# Patient Record
Sex: FEMALE | Race: WHITE | NOT HISPANIC OR LATINO | Employment: FULL TIME | URBAN - METROPOLITAN AREA
[De-identification: names, ages, dates, MRNs, and addresses within clinical notes are randomized per-mention and may not be internally consistent; named-entity substitution may affect disease eponyms.]

---

## 2017-05-11 ENCOUNTER — HOSPITAL ENCOUNTER (OUTPATIENT)
Dept: FAMILY MEDICINE CLINIC | Facility: CLINIC | Age: 57
Setting detail: SPECIMEN
Discharge: HOME | End: 2017-05-11
Attending: FAMILY MEDICINE | Admitting: FAMILY MEDICINE

## 2017-05-11 LAB
ALBUMIN SERPL-MCNC: 3.8 G/DL (ref 3.5–4.8)
ALP SERPL-CCNC: 79 IU/L (ref 32–91)
ALT SERPL-CCNC: 30 IU/L (ref 14–54)
AST SERPL-CCNC: 23 IU/L (ref 15–41)
BILIRUB DIRECT SERPL-MCNC: 0.1 MG/DL (ref 0.1–0.5)
BILIRUB SERPL-MCNC: 0.7 MG/DL (ref 0.3–1.2)
CHOLEST SERPL-MCNC: 169 MG/DL
CHOLEST/HDLC SERPL: 3.5 {RATIO}
CONV LDL CHOLESTEROL DIRECT: 99 MG/DL (ref 0–100)
CONV TOTAL PROTEIN: 6.9 G/DL (ref 6.1–7.9)
HDLC SERPL-MCNC: 48 MG/DL
LDLC/HDLC SERPL: 2.1 {RATIO}
LIPID INTERPRETATION: ABNORMAL
TRIGL SERPL-MCNC: 124 MG/DL
TSH SERPL-ACNC: 3.06 UIU/ML (ref 0.34–5.6)
VLDLC SERPL CALC-MCNC: 21.5 MG/DL

## 2018-06-08 ENCOUNTER — HOSPITAL ENCOUNTER (OUTPATIENT)
Dept: FAMILY MEDICINE CLINIC | Facility: CLINIC | Age: 58
Setting detail: SPECIMEN
Discharge: HOME | End: 2018-06-08
Attending: FAMILY MEDICINE | Admitting: FAMILY MEDICINE

## 2018-06-08 LAB
ALBUMIN SERPL-MCNC: 4.1 G/DL (ref 3.5–4.8)
ALBUMIN/GLOB SERPL: 1.5 {RATIO} (ref 1–1.7)
ALP SERPL-CCNC: 80 IU/L (ref 32–91)
ALT SERPL-CCNC: 26 IU/L (ref 14–54)
ANION GAP SERPL CALC-SCNC: 12.5 MMOL/L (ref 10–20)
AST SERPL-CCNC: 25 IU/L (ref 15–41)
BILIRUB SERPL-MCNC: 1.1 MG/DL (ref 0.3–1.2)
BUN SERPL-MCNC: 11 MG/DL (ref 8–20)
BUN/CREAT SERPL: 15.7 (ref 5.4–26.2)
CALCIUM SERPL-MCNC: 9.4 MG/DL (ref 8.9–10.3)
CHLORIDE SERPL-SCNC: 97 MMOL/L (ref 101–111)
CHOLEST SERPL-MCNC: 229 MG/DL
CHOLEST/HDLC SERPL: 3.6 {RATIO}
CONV CO2: 27 MMOL/L (ref 22–32)
CONV LDL CHOLESTEROL DIRECT: 155 MG/DL (ref 0–100)
CONV TOTAL PROTEIN: 6.9 G/DL (ref 6.1–7.9)
CREAT UR-MCNC: 0.7 MG/DL (ref 0.4–1)
GLOBULIN UR ELPH-MCNC: 2.8 G/DL (ref 2.5–3.8)
GLUCOSE SERPL-MCNC: 91 MG/DL (ref 65–99)
HDLC SERPL-MCNC: 63 MG/DL
LDLC/HDLC SERPL: 2.5 {RATIO}
LIPID INTERPRETATION: ABNORMAL
POTASSIUM SERPL-SCNC: 3.5 MMOL/L (ref 3.6–5.1)
SODIUM SERPL-SCNC: 133 MMOL/L (ref 136–144)
TRIGL SERPL-MCNC: 109 MG/DL
VLDLC SERPL CALC-MCNC: 10.9 MG/DL

## 2018-11-15 ENCOUNTER — HOSPITAL ENCOUNTER (OUTPATIENT)
Dept: FAMILY MEDICINE CLINIC | Facility: CLINIC | Age: 58
Setting detail: SPECIMEN
Discharge: HOME | End: 2018-11-15
Attending: FAMILY MEDICINE | Admitting: FAMILY MEDICINE

## 2018-11-15 LAB
ANION GAP SERPL CALC-SCNC: 13.2 MMOL/L (ref 10–20)
BUN SERPL-MCNC: 14 MG/DL (ref 8–20)
BUN/CREAT SERPL: 20 (ref 5.4–26.2)
CALCIUM SERPL-MCNC: 9 MG/DL (ref 8.9–10.3)
CHLORIDE SERPL-SCNC: 106 MMOL/L (ref 101–111)
CONV CO2: 25 MMOL/L (ref 22–32)
CREAT UR-MCNC: 0.7 MG/DL (ref 0.4–1)
GLUCOSE SERPL-MCNC: 98 MG/DL (ref 65–99)
POTASSIUM SERPL-SCNC: 4.2 MMOL/L (ref 3.6–5.1)
SODIUM SERPL-SCNC: 140 MMOL/L (ref 136–144)

## 2019-02-27 ENCOUNTER — HOSPITAL ENCOUNTER (OUTPATIENT)
Dept: FAMILY MEDICINE CLINIC | Facility: CLINIC | Age: 59
Setting detail: SPECIMEN
Discharge: HOME | End: 2019-02-27
Attending: FAMILY MEDICINE | Admitting: FAMILY MEDICINE

## 2019-05-16 ENCOUNTER — HOSPITAL ENCOUNTER (OUTPATIENT)
Dept: FAMILY MEDICINE CLINIC | Facility: CLINIC | Age: 59
Setting detail: SPECIMEN
Discharge: HOME | End: 2019-05-16
Attending: FAMILY MEDICINE | Admitting: FAMILY MEDICINE

## 2019-05-16 LAB
T4 FREE SERPL-MCNC: 1.68 NG/DL (ref 0.58–1.64)
TSH SERPL-ACNC: 0.02 UIU/ML (ref 0.34–5.6)

## 2019-07-02 RX ORDER — MONTELUKAST SODIUM 10 MG/1
1 TABLET ORAL DAILY
COMMUNITY
Start: 2019-02-27

## 2019-07-02 RX ORDER — MONTELUKAST SODIUM 10 MG/1
TABLET ORAL
Qty: 90 TABLET | Refills: 0 | OUTPATIENT
Start: 2019-07-02

## 2019-07-02 NOTE — TELEPHONE ENCOUNTER
Last visit:  5/16/19  Next visit: none  Last labs: 5/16/19    Rx requested: Singulair 10mg  Pharmacy: [facility] in [place]

## 2019-11-01 ENCOUNTER — TELEPHONE (OUTPATIENT)
Dept: FAMILY MEDICINE CLINIC | Facility: CLINIC | Age: 59
End: 2019-11-01

## 2019-11-01 RX ORDER — TRAZODONE HYDROCHLORIDE 50 MG/1
TABLET ORAL EVERY 24 HOURS
COMMUNITY
Start: 2017-07-27 | End: 2019-12-05 | Stop reason: SDUPTHER

## 2019-11-01 RX ORDER — LEVOTHYROXINE SODIUM 0.07 MG/1
TABLET ORAL EVERY 24 HOURS
COMMUNITY
Start: 2017-07-27 | End: 2019-11-14 | Stop reason: SDUPTHER

## 2019-11-01 RX ORDER — LOSARTAN POTASSIUM 50 MG/1
TABLET ORAL EVERY 24 HOURS
COMMUNITY
Start: 2016-03-21 | End: 2019-12-05

## 2019-11-01 RX ORDER — ATORVASTATIN CALCIUM 40 MG/1
TABLET, FILM COATED ORAL EVERY 24 HOURS
COMMUNITY
Start: 2017-07-27 | End: 2020-06-15 | Stop reason: SDUPTHER

## 2019-11-01 RX ORDER — VITAMIN B COMPLEX
1 TABLET ORAL DAILY
COMMUNITY
Start: 2018-06-08

## 2019-11-01 RX ORDER — AZELASTINE HCL 205.5 UG/1
SPRAY NASAL
COMMUNITY
Start: 2019-02-27 | End: 2019-12-05

## 2019-11-01 RX ORDER — CHOLECALCIFEROL (VITAMIN D3) 50 MCG
TABLET ORAL
COMMUNITY
Start: 2016-05-26

## 2019-11-13 DIAGNOSIS — E78.2 MIXED HYPERLIPIDEMIA: Primary | ICD-10-CM

## 2019-11-13 DIAGNOSIS — E03.9 ACQUIRED HYPOTHYROIDISM: ICD-10-CM

## 2019-11-13 PROBLEM — M51.36 DEGENERATION OF INTERVERTEBRAL DISC OF LUMBAR REGION: Status: ACTIVE | Noted: 2017-08-01

## 2019-11-13 RX ORDER — TRAZODONE HYDROCHLORIDE 50 MG/1
TABLET ORAL
Qty: 90 TABLET | Refills: 4 | OUTPATIENT
Start: 2019-11-13

## 2019-11-13 RX ORDER — LEVOTHYROXINE SODIUM 88 UG/1
TABLET ORAL
Qty: 90 TABLET | Refills: 4 | OUTPATIENT
Start: 2019-11-13

## 2019-11-14 RX ORDER — LEVOTHYROXINE SODIUM 0.07 MG/1
75 TABLET ORAL EVERY 24 HOURS
Qty: 90 TABLET | Refills: 0 | Status: SHIPPED | OUTPATIENT
Start: 2019-11-14 | End: 2020-01-02 | Stop reason: SDUPTHER

## 2019-11-14 NOTE — TELEPHONE ENCOUNTER
Pt scheduled both labs and f/u and wants to know if you can [facility]

## 2019-11-26 ENCOUNTER — CLINICAL SUPPORT (OUTPATIENT)
Dept: FAMILY MEDICINE CLINIC | Facility: CLINIC | Age: 59
End: 2019-11-26

## 2019-11-26 DIAGNOSIS — F32.A DEPRESSIVE DISORDER: ICD-10-CM

## 2019-11-26 DIAGNOSIS — E78.2 MIXED HYPERLIPIDEMIA: ICD-10-CM

## 2019-11-26 DIAGNOSIS — E03.9 ACQUIRED HYPOTHYROIDISM: ICD-10-CM

## 2019-11-26 PROCEDURE — 80061 LIPID PANEL: CPT | Performed by: FAMILY MEDICINE

## 2019-11-26 PROCEDURE — 36415 COLL VENOUS BLD VENIPUNCTURE: CPT | Performed by: FAMILY MEDICINE

## 2019-11-26 PROCEDURE — 80053 COMPREHEN METABOLIC PANEL: CPT | Performed by: FAMILY MEDICINE

## 2019-11-27 LAB
ALBUMIN SERPL-MCNC: 4.3 G/DL (ref 3.5–5.2)
ALBUMIN/GLOB SERPL: 1.5 G/DL
ALP SERPL-CCNC: 79 U/L (ref 39–117)
ALT SERPL W P-5'-P-CCNC: 30 U/L (ref 1–33)
ANION GAP SERPL CALCULATED.3IONS-SCNC: 14.3 MMOL/L (ref 5–15)
AST SERPL-CCNC: 25 U/L (ref 1–32)
BILIRUB SERPL-MCNC: 0.3 MG/DL (ref 0.2–1.2)
BUN BLD-MCNC: 15 MG/DL (ref 8–23)
BUN/CREAT SERPL: 24.6 (ref 7–25)
CALCIUM SPEC-SCNC: 9.5 MG/DL (ref 8.6–10.5)
CHLORIDE SERPL-SCNC: 120 MMOL/L (ref 98–107)
CHOLEST SERPL-MCNC: 142 MG/DL (ref 0–200)
CO2 SERPL-SCNC: 25.7 MMOL/L (ref 22–29)
CREAT BLD-MCNC: 0.61 MG/DL (ref 0.57–1)
GFR SERPL CREATININE-BSD FRML MDRD: 100 ML/MIN/1.73
GLOBULIN UR ELPH-MCNC: 2.9 GM/DL
GLUCOSE BLD-MCNC: 91 MG/DL (ref 65–99)
HDLC SERPL-MCNC: 65 MG/DL (ref 40–60)
LDLC SERPL CALC-MCNC: 67 MG/DL (ref 0–100)
LDLC/HDLC SERPL: 1.02 {RATIO}
POTASSIUM BLD-SCNC: 4.7 MMOL/L (ref 3.5–5.2)
PROT SERPL-MCNC: 7.2 G/DL (ref 6–8.5)
SODIUM BLD-SCNC: 160 MMOL/L (ref 136–145)
TRIGL SERPL-MCNC: 52 MG/DL (ref 0–150)
VLDLC SERPL-MCNC: 10.4 MG/DL (ref 5–40)

## 2019-12-03 DIAGNOSIS — E87.0 HYPERNATREMIA: Primary | ICD-10-CM

## 2019-12-05 ENCOUNTER — OFFICE VISIT (OUTPATIENT)
Dept: FAMILY MEDICINE CLINIC | Facility: CLINIC | Age: 59
End: 2019-12-05

## 2019-12-05 VITALS
DIASTOLIC BLOOD PRESSURE: 84 MMHG | OXYGEN SATURATION: 100 % | RESPIRATION RATE: 14 BRPM | WEIGHT: 198 LBS | SYSTOLIC BLOOD PRESSURE: 130 MMHG | HEART RATE: 63 BPM | BODY MASS INDEX: 30.01 KG/M2 | TEMPERATURE: 97.8 F | HEIGHT: 68 IN

## 2019-12-05 DIAGNOSIS — F51.01 PRIMARY INSOMNIA: ICD-10-CM

## 2019-12-05 DIAGNOSIS — J06.9 ACUTE URI: ICD-10-CM

## 2019-12-05 DIAGNOSIS — F33.1 MODERATE EPISODE OF RECURRENT MAJOR DEPRESSIVE DISORDER (HCC): Primary | ICD-10-CM

## 2019-12-05 DIAGNOSIS — E55.9 VITAMIN D DEFICIENCY: ICD-10-CM

## 2019-12-05 DIAGNOSIS — F41.9 ANXIETY: ICD-10-CM

## 2019-12-05 PROBLEM — E03.9 HYPOTHYROIDISM: Status: ACTIVE | Noted: 2019-12-05

## 2019-12-05 PROCEDURE — 82306 VITAMIN D 25 HYDROXY: CPT | Performed by: FAMILY MEDICINE

## 2019-12-05 PROCEDURE — 80053 COMPREHEN METABOLIC PANEL: CPT | Performed by: FAMILY MEDICINE

## 2019-12-05 PROCEDURE — 99214 OFFICE O/P EST MOD 30 MIN: CPT | Performed by: FAMILY MEDICINE

## 2019-12-05 RX ORDER — TRIAMCINOLONE ACETONIDE 55 UG/1
2 SPRAY, METERED NASAL DAILY
COMMUNITY

## 2019-12-05 RX ORDER — TRAZODONE HYDROCHLORIDE 50 MG/1
TABLET ORAL
Qty: 60 TABLET | Refills: 3 | Status: SHIPPED | OUTPATIENT
Start: 2019-12-05 | End: 2020-01-02 | Stop reason: SDUPTHER

## 2019-12-05 RX ORDER — ALPRAZOLAM 0.25 MG/1
0.25 TABLET ORAL 2 TIMES DAILY PRN
Qty: 40 TABLET | Refills: 0 | Status: SHIPPED | OUTPATIENT
Start: 2019-12-05

## 2019-12-05 RX ORDER — DOXYCYCLINE 100 MG/1
200 TABLET ORAL DAILY
Qty: 20 TABLET | Refills: 0 | Status: SHIPPED | OUTPATIENT
Start: 2019-12-05 | End: 2020-01-02

## 2019-12-05 RX ORDER — BUPROPION HYDROCHLORIDE 150 MG/1
150 TABLET ORAL DAILY
Qty: 30 TABLET | Refills: 1 | Status: SHIPPED | OUTPATIENT
Start: 2019-12-05 | End: 2020-01-02

## 2019-12-05 NOTE — PROGRESS NOTES
Subjective   Shira Dumont is a 59 y.o. female.     Chief Complaint   Patient presents with   • Sinus Problem     sinus headache,nasal congestion x 1 week. pt does allergy injections.   • Anxiety         Current Outpatient Medications:   •  Apple Cider Vinegar 300 MG tablet, Take 2 tablets by mouth Daily., Disp: , Rfl:   •  atorvastatin (LIPITOR) 40 MG tablet, Daily., Disp: , Rfl:   •  B Complex Vitamins (B COMPLEX 50) tablet, B COMPLEX 50 ORAL TABLET, Disp: , Rfl:   •  Cholecalciferol (VITAMIN D) 50 MCG (2000 UT) tablet, VITAMIN D 2000 UNIT TABS, Disp: , Rfl:   •  levothyroxine (LEVOXYL) 75 MCG tablet, Take 1 tablet by mouth Daily., Disp: 90 tablet, Rfl: 0  •  montelukast (SINGULAIR) 10 MG tablet, Take 1 tablet by mouth Daily., Disp: , Rfl:   •  traZODone (DESYREL) 50 MG tablet, 1 - 2 po qhs prn insomnia, Disp: 60 tablet, Rfl: 3  •  Triamcinolone Acetonide (NASACORT) 55 MCG/ACT nasal inhaler, 2 sprays into the nostril(s) as directed by provider Daily., Disp: , Rfl:   •  ALPRAZolam (XANAX) 0.25 MG tablet, Take 1 tablet by mouth 2 (Two) Times a Day As Needed for Anxiety., Disp: 40 tablet, Rfl: 0  •  buPROPion XL (WELLBUTRIN XL) 150 MG 24 hr tablet, Take 1 tablet by mouth Daily., Disp: 30 tablet, Rfl: 1  •  doxycycline (ADOXA) 100 MG tablet, Take 2 tablets by mouth Daily., Disp: 20 tablet, Rfl: 0    Past Medical History:   Diagnosis Date   • Anxiety    • Degeneration of intervertebral disc of lumbar region 2017   • Depressive disorder 3/21/2016   • Hypothyroidism        Past Surgical History:   Procedure Laterality Date   • ACHILLES TENDON SURGERY Right    • BREAST BIOPSY      Left Bx = NEG   •  SECTION     • CYST REMOVAL Left     Left Wrist = Benign Cyst   • ESSURE TUBAL LIGATION     • TONSILLECTOMY         Family History   Problem Relation Age of Onset   • Cancer Mother         Esophageal Cancer   • Heart disease Father    • Hypertension Father    • Cancer Father         Breast Cancer       Social  History     Socioeconomic History   • Marital status:      Spouse name: Not on file   • Number of children: Not on file   • Years of education: Not on file   • Highest education level: Not on file   Tobacco Use   • Smoking status: Never Smoker   • Smokeless tobacco: Never Used   Substance and Sexual Activity   • Alcohol use: Yes     Comment: 1-2   • Drug use: No   • Sexual activity: Defer       58 y/o C female here for c/o's worsening Anx/ depression/ insomnia and sinus c/o's x 1 wk      Pt has tried Cymbalta/ Prozac/ Wellbutrin in past for Depression but no meds for anxiety tried in the past    Pt has had a lot of stress over the recent years w/ a teenage son w/ Autism/  a bad divorce/ her dad  and her best friend committed suicide-----Pt feels like she is all alone w/o much support but admits she does have a grown daughter     Pt not sleeping well either and states the first time she took Trazodone, she was so tired in am, she called sick into work; she tends to wake up throughout the nite off/on    Pt was frustrated about not being able to lose wt and saw wilder who confirmed the decrease in thyroid med was adequate..... He encouraged better eating habits and she gave it a month and gained 15#!!! Now she has 30# to lose ....the patient admits she really doesn't have time to exercise due to working and having to stick to a rigid schdule for her son......then she is so tired by the time he goes to bed at 8pm, she does too         The following portions of the patient's history were reviewed and updated as appropriate: allergies, current medications, past family history, past medical history, past social history, past surgical history and problem list.    Review of Systems   Constitutional: Positive for fatigue. Negative for chills and fever.   HENT: Positive for congestion and sinus pressure. Negative for ear discharge, ear pain, postnasal drip, rhinorrhea, sneezing, sore throat and swollen glands.     Eyes: Negative for pain, discharge, redness, itching and visual disturbance.   Respiratory: Negative for cough, shortness of breath, wheezing and stridor.    Skin: Negative for rash.   Allergic/Immunologic: Negative for environmental allergies.   Psychiatric/Behavioral: Positive for decreased concentration, sleep disturbance, depressed mood and stress. Negative for self-injury and suicidal ideas. The patient is nervous/anxious.        Vitals:    12/05/19 0834   BP: 130/84   Pulse: 63   Resp: 14   Temp: 97.8 °F (36.6 °C)   SpO2: 100%       Objective   Physical Exam   Constitutional: She is oriented to person, place, and time. She appears well-developed and well-nourished. No distress.   HENT:   Head: Normocephalic and atraumatic.   Right Ear: External ear normal.   Left Ear: External ear normal.   Nose: Nasal deformity (Rt nasal polyp) present.   Mouth/Throat: Oropharynx is clear and moist. No oropharyngeal exudate.   Eyes: Conjunctivae and EOM are normal. Pupils are equal, round, and reactive to light. Right eye exhibits no discharge. Left eye exhibits no discharge. No scleral icterus.   Neck: Normal range of motion. Neck supple. No thyromegaly present.   Cardiovascular: Normal rate, regular rhythm, normal heart sounds and intact distal pulses.   No murmur heard.  Pulmonary/Chest: Effort normal and breath sounds normal. No respiratory distress. She has no wheezes. She has no rales.   Lymphadenopathy:     She has no cervical adenopathy.   Neurological: She is alert and oriented to person, place, and time. No cranial nerve deficit.   Skin: Skin is warm and dry. No rash noted. She is not diaphoretic. No erythema. No pallor.   Psychiatric: Her speech is normal and behavior is normal. Judgment and thought content normal. She is not actively hallucinating. Cognition and memory are normal. She exhibits a depressed mood. She is attentive.   Nursing note and vitals reviewed.        Assessment/Plan   Shira was seen today  for sinus problem and anxiety.    Diagnoses and all orders for this visit:    Moderate episode of recurrent major depressive disorder (CMS/HCC)    Anxiety    Acute URI    Primary insomnia    Vitamin D deficiency  -     Vitamin D 25 Hydroxy    Other orders  -     traZODone (DESYREL) 50 MG tablet; 1 - 2 po qhs prn insomnia  -     buPROPion XL (WELLBUTRIN XL) 150 MG 24 hr tablet; Take 1 tablet by mouth Daily.  -     ALPRAZolam (XANAX) 0.25 MG tablet; Take 1 tablet by mouth 2 (Two) Times a Day As Needed for Anxiety.  -     doxycycline (ADOXA) 100 MG tablet; Take 2 tablets by mouth Daily.    RTC 1mo

## 2019-12-06 ENCOUNTER — TELEPHONE (OUTPATIENT)
Dept: FAMILY MEDICINE CLINIC | Facility: CLINIC | Age: 59
End: 2019-12-06

## 2019-12-06 LAB
25(OH)D3 SERPL-MCNC: 42 NG/ML (ref 30–100)
ALBUMIN SERPL-MCNC: 4.6 G/DL (ref 3.5–5.2)
ALBUMIN/GLOB SERPL: 1.6 G/DL
ALP SERPL-CCNC: 84 U/L (ref 39–117)
ALT SERPL W P-5'-P-CCNC: 29 U/L (ref 1–33)
ANION GAP SERPL CALCULATED.3IONS-SCNC: 13.9 MMOL/L (ref 5–15)
AST SERPL-CCNC: 27 U/L (ref 1–32)
BILIRUB SERPL-MCNC: 0.3 MG/DL (ref 0.2–1.2)
BUN BLD-MCNC: 19 MG/DL (ref 8–23)
BUN/CREAT SERPL: 30.2 (ref 7–25)
CALCIUM SPEC-SCNC: 9.5 MG/DL (ref 8.6–10.5)
CHLORIDE SERPL-SCNC: 107 MMOL/L (ref 98–107)
CO2 SERPL-SCNC: 25.1 MMOL/L (ref 22–29)
CREAT BLD-MCNC: 0.63 MG/DL (ref 0.57–1)
GFR SERPL CREATININE-BSD FRML MDRD: 96 ML/MIN/1.73
GLOBULIN UR ELPH-MCNC: 2.9 GM/DL
GLUCOSE BLD-MCNC: 92 MG/DL (ref 65–99)
POTASSIUM BLD-SCNC: 4.6 MMOL/L (ref 3.5–5.2)
PROT SERPL-MCNC: 7.5 G/DL (ref 6–8.5)
SODIUM BLD-SCNC: 146 MMOL/L (ref 136–145)

## 2019-12-06 NOTE — TELEPHONE ENCOUNTER
[facility]ed [facility] to request Mammo results from 2019.  They do not have any record of her having her mammo done there.  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  Patient is going to try to get mammo results.

## 2020-01-02 ENCOUNTER — RESULTS ENCOUNTER (OUTPATIENT)
Dept: FAMILY MEDICINE CLINIC | Facility: CLINIC | Age: 60
End: 2020-01-02

## 2020-01-02 ENCOUNTER — OFFICE VISIT (OUTPATIENT)
Dept: FAMILY MEDICINE CLINIC | Facility: CLINIC | Age: 60
End: 2020-01-02

## 2020-01-02 VITALS
TEMPERATURE: 97.5 F | WEIGHT: 196 LBS | HEART RATE: 69 BPM | RESPIRATION RATE: 14 BRPM | DIASTOLIC BLOOD PRESSURE: 87 MMHG | HEIGHT: 68 IN | SYSTOLIC BLOOD PRESSURE: 126 MMHG | OXYGEN SATURATION: 97 % | BODY MASS INDEX: 29.7 KG/M2

## 2020-01-02 DIAGNOSIS — E87.0 HYPERNATREMIA: ICD-10-CM

## 2020-01-02 DIAGNOSIS — F33.41 RECURRENT MAJOR DEPRESSIVE DISORDER, IN PARTIAL REMISSION (HCC): Primary | ICD-10-CM

## 2020-01-02 DIAGNOSIS — F51.01 PRIMARY INSOMNIA: ICD-10-CM

## 2020-01-02 DIAGNOSIS — Z12.11 COLON CANCER SCREENING: ICD-10-CM

## 2020-01-02 DIAGNOSIS — S39.012A ACUTE MYOFASCIAL STRAIN OF LUMBAR REGION, INITIAL ENCOUNTER: ICD-10-CM

## 2020-01-02 DIAGNOSIS — R23.8 SCALP IRRITATION: ICD-10-CM

## 2020-01-02 DIAGNOSIS — L65.9 PATCHY LOSS OF HAIR: ICD-10-CM

## 2020-01-02 PROCEDURE — 99214 OFFICE O/P EST MOD 30 MIN: CPT | Performed by: FAMILY MEDICINE

## 2020-01-02 RX ORDER — CYCLOBENZAPRINE HCL 10 MG
10 TABLET ORAL NIGHTLY PRN
Qty: 10 TABLET | Refills: 0 | Status: SHIPPED | OUTPATIENT
Start: 2020-01-02 | End: 2020-01-03

## 2020-01-02 RX ORDER — BUPROPION HYDROCHLORIDE 300 MG/1
300 TABLET ORAL EVERY MORNING
Qty: 30 TABLET | Refills: 1 | Status: SHIPPED | OUTPATIENT
Start: 2020-01-02 | End: 2020-03-09

## 2020-01-02 RX ORDER — LEVOTHYROXINE SODIUM 0.07 MG/1
75 TABLET ORAL EVERY 24 HOURS
Qty: 90 TABLET | Refills: 2 | Status: SHIPPED | OUTPATIENT
Start: 2020-01-02 | End: 2020-12-03 | Stop reason: SDUPTHER

## 2020-01-02 RX ORDER — TRAZODONE HYDROCHLORIDE 50 MG/1
TABLET ORAL
Qty: 60 TABLET | Refills: 3
Start: 2020-01-02 | End: 2020-03-30 | Stop reason: SDUPTHER

## 2020-01-02 NOTE — PROGRESS NOTES
Subjective   Shira Dumont is a 59 y.o. female.     Chief Complaint   Patient presents with   • Depression     1 month f/u on buproprion. Also her sodium level was too high.           Current Outpatient Medications:   •  ALPRAZolam (XANAX) 0.25 MG tablet, Take 1 tablet by mouth 2 (Two) Times a Day As Needed for Anxiety., Disp: 40 tablet, Rfl: 0  •  Apple Cider Vinegar 300 MG tablet, Take 2 tablets by mouth Daily., Disp: , Rfl:   •  atorvastatin (LIPITOR) 40 MG tablet, Daily., Disp: , Rfl:   •  B Complex Vitamins (B COMPLEX 50) tablet, Take 1 tablet by mouth Daily., Disp: , Rfl:   •  buPROPion XL (WELLBUTRIN XL) 300 MG 24 hr tablet, Take 1 tablet by mouth Every Morning., Disp: 30 tablet, Rfl: 1  •  Cholecalciferol (VITAMIN D) 50 MCG (2000 UT) tablet, VITAMIN D 2000 UNIT TABS, Disp: , Rfl:   •  levothyroxine (LEVOXYL) 75 MCG tablet, Take 1 tablet by mouth Daily., Disp: 90 tablet, Rfl: 2  •  montelukast (SINGULAIR) 10 MG tablet, Take 1 tablet by mouth Daily., Disp: , Rfl:   •  Multiple Vitamins-Minerals (HAIR SKIN AND NAILS FORMULA PO), Take 2 tablets by mouth Daily., Disp: , Rfl:   •  traZODone (DESYREL) 50 MG tablet, 3 po qhs prn insomnia, Disp: 60 tablet, Rfl: 3  •  Triamcinolone Acetonide (NASACORT) 55 MCG/ACT nasal inhaler, 2 sprays into the nostril(s) as directed by provider Daily., Disp: , Rfl:   •  cyclobenzaprine (FLEXERIL) 10 MG tablet, Take 1 tablet by mouth At Night As Needed for Muscle Spasms., Disp: 10 tablet, Rfl: 0    Past Medical History:   Diagnosis Date   • Anxiety    • Degeneration of intervertebral disc of lumbar region 2017   • Depressive disorder 3/21/2016   • Hypothyroidism        Past Surgical History:   Procedure Laterality Date   • ACHILLES TENDON SURGERY Right    • BREAST BIOPSY      Left Bx = NEG   •  SECTION     • CYST REMOVAL Left     Left Wrist = Benign Cyst   • ESSURE TUBAL LIGATION     • TONSILLECTOMY         Family History   Problem Relation Age of Onset   • Cancer Mother          Esophageal Cancer   • Heart disease Father    • Hypertension Father    • Cancer Father         Breast Cancer       Social History     Socioeconomic History   • Marital status:      Spouse name: Not on file   • Number of children: Not on file   • Years of education: Not on file   • Highest education level: Not on file   Tobacco Use   • Smoking status: Never Smoker   • Smokeless tobacco: Never Used   Substance and Sexual Activity   • Alcohol use: Yes     Comment: 1-2   • Drug use: No   • Sexual activity: Defer       58 y/o C female here for f/u on anti-depressant med/ insomnia    Pt feeling some better mentally but not feeling completely there yet and still not sleeping well on the 100mg Trazodone QHS    Pt also states she has been having hair breakage/ loss in the area above her Left ear only off/on x 6mos-------the same area will have hair breakage at the base and then eventually grow back    Pt also states she hurt her back again and wanting something for ms spasm-----Pt used to ride horses in [place] and did gymnastics so this is not new for her       The following portions of the patient's history were reviewed and updated as appropriate: allergies, current medications, past family history, past medical history, past social history, past surgical history and problem list.    Review of Systems   Constitutional: Negative for appetite change, unexpected weight gain and unexpected weight loss.   Endocrine:        Patchy hair loss @ Left supra- auricular area    Musculoskeletal: Positive for arthralgias, back pain and myalgias.   Skin: Negative for rash.   Psychiatric/Behavioral: Positive for dysphoric mood, sleep disturbance and depressed mood. The patient is not nervous/anxious.        Vitals:    01/02/20 0812   BP: 126/87   Pulse: 69   Resp: 14   Temp: 97.5 °F (36.4 °C)   SpO2: 97%       Objective   Physical Exam   Constitutional: She is oriented to person, place, and time. She appears well-developed  and well-nourished. No distress.   HENT:   Head:       Neurological: She is alert and oriented to person, place, and time. No cranial nerve deficit.   Skin: Skin is warm and dry. No rash noted. No erythema.   Psychiatric: She has a normal mood and affect. Her behavior is normal. Judgment and thought content normal.   Nursing note and vitals reviewed.        Assessment/Plan   Shira was seen today for depression.    Diagnoses and all orders for this visit:    Recurrent major depressive disorder, in partial remission (CMS/HCC)    Primary insomnia    Patchy loss of hair  -     Ambulatory Referral to Dermatology    Scalp irritation  -     Ambulatory Referral to Dermatology    Acute myofascial strain of lumbar region, initial encounter    Colon cancer screening  -     Cologuard - Stool, Per Rectum; Future    Hypernatremia    Other orders  -     buPROPion XL (WELLBUTRIN XL) 300 MG 24 hr tablet; Take 1 tablet by mouth Every Morning.  -     traZODone (DESYREL) 50 MG tablet; 3 po qhs prn insomnia  -     levothyroxine (LEVOXYL) 75 MCG tablet; Take 1 tablet by mouth Daily.  -     cyclobenzaprine (FLEXERIL) 10 MG tablet; Take 1 tablet by mouth At Night As Needed for Muscle Spasms.    Increase Wellbutrin/ Trazodone dose   Flexeril QHS prn   reviewed recent CMP w/ resolved Hypernatremia  Referral to Derm for recurrent hair loss

## 2020-01-03 ENCOUNTER — TELEPHONE (OUTPATIENT)
Dept: FAMILY MEDICINE CLINIC | Facility: CLINIC | Age: 60
End: 2020-01-03

## 2020-01-03 RX ORDER — METHOCARBAMOL 500 MG/1
TABLET, FILM COATED ORAL
Qty: 20 TABLET | Refills: 0 | Status: SHIPPED | OUTPATIENT
Start: 2020-01-03

## 2020-01-03 NOTE — TELEPHONE ENCOUNTER
Patient called stating that she was in to see  recently and was given Flexeril 10mg and it doesn't seem to be helping and she wants to know if she can take 2 of those at night.     (Also when we call her back give her the number to  as we sent the referral but they have not called [phone])

## 2020-01-03 NOTE — TELEPHONE ENCOUNTER
Pt ok to change ms relaxer. States she did not get any sleep last night. Please send new rx to [facility].    Already has appt sched in Feb w [facility].

## 2020-03-09 RX ORDER — BUPROPION HYDROCHLORIDE 300 MG/1
TABLET ORAL
Qty: 30 TABLET | Refills: 3 | Status: SHIPPED | OUTPATIENT
Start: 2020-03-09 | End: 2020-03-30 | Stop reason: SDUPTHER

## 2020-03-30 ENCOUNTER — TELEPHONE (OUTPATIENT)
Dept: FAMILY MEDICINE CLINIC | Facility: CLINIC | Age: 60
End: 2020-03-30

## 2020-03-30 RX ORDER — BUPROPION HYDROCHLORIDE 300 MG/1
300 TABLET ORAL EVERY MORNING
Qty: 90 TABLET | Refills: 0 | Status: SHIPPED | OUTPATIENT
Start: 2020-03-30 | End: 2020-07-13

## 2020-03-30 RX ORDER — TRAZODONE HYDROCHLORIDE 150 MG/1
150 TABLET ORAL NIGHTLY PRN
Qty: 90 TABLET | Refills: 0 | Status: SHIPPED | OUTPATIENT
Start: 2020-03-30 | End: 2020-09-03

## 2020-03-30 NOTE — TELEPHONE ENCOUNTER
Last visit: 1/2/20  Next visit: none  Last labs: 12/5/19    Rx requested: Trazodone, Wellbutrin   [facility]    Can no longer get these local per her insurance.

## 2020-06-15 ENCOUNTER — OFFICE VISIT (OUTPATIENT)
Dept: FAMILY MEDICINE CLINIC | Facility: CLINIC | Age: 60
End: 2020-06-15

## 2020-06-15 VITALS
OXYGEN SATURATION: 100 % | DIASTOLIC BLOOD PRESSURE: 86 MMHG | SYSTOLIC BLOOD PRESSURE: 137 MMHG | TEMPERATURE: 97.5 F | WEIGHT: 197 LBS | BODY MASS INDEX: 29.86 KG/M2 | HEIGHT: 68 IN | HEART RATE: 61 BPM | RESPIRATION RATE: 16 BRPM

## 2020-06-15 DIAGNOSIS — E03.9 ACQUIRED HYPOTHYROIDISM: ICD-10-CM

## 2020-06-15 DIAGNOSIS — T63.461A WASP STING, ACCIDENTAL OR UNINTENTIONAL, INITIAL ENCOUNTER: Primary | ICD-10-CM

## 2020-06-15 PROCEDURE — 84443 ASSAY THYROID STIM HORMONE: CPT | Performed by: FAMILY MEDICINE

## 2020-06-15 PROCEDURE — 99213 OFFICE O/P EST LOW 20 MIN: CPT | Performed by: FAMILY MEDICINE

## 2020-06-15 PROCEDURE — 84481 FREE ASSAY (FT-3): CPT | Performed by: FAMILY MEDICINE

## 2020-06-15 PROCEDURE — 36415 COLL VENOUS BLD VENIPUNCTURE: CPT | Performed by: FAMILY MEDICINE

## 2020-06-15 PROCEDURE — 84439 ASSAY OF FREE THYROXINE: CPT | Performed by: FAMILY MEDICINE

## 2020-06-15 RX ORDER — PROMETHAZINE HYDROCHLORIDE 12.5 MG/1
TABLET ORAL
Qty: 30 TABLET | Refills: 0 | Status: SHIPPED | OUTPATIENT
Start: 2020-06-15

## 2020-06-15 RX ORDER — PREDNISONE 20 MG/1
60 TABLET ORAL DAILY
Qty: 15 TABLET | Refills: 0 | Status: SHIPPED | OUTPATIENT
Start: 2020-06-15

## 2020-06-15 RX ORDER — ATORVASTATIN CALCIUM 40 MG/1
40 TABLET, FILM COATED ORAL EVERY 24 HOURS
Qty: 90 TABLET | Refills: 1 | Status: SHIPPED | OUTPATIENT
Start: 2020-06-15 | End: 2021-04-02 | Stop reason: SDUPTHER

## 2020-06-15 RX ORDER — FAMOTIDINE 20 MG/1
20 TABLET, FILM COATED ORAL 2 TIMES DAILY
Qty: 30 TABLET | Refills: 0 | Status: SHIPPED | OUTPATIENT
Start: 2020-06-15

## 2020-06-15 NOTE — PROGRESS NOTES
Subjective   Shira Dumont is a 59 y.o. female.     Chief Complaint   Patient presents with   • Insect Bite         Current Outpatient Medications:   •  ALPRAZolam (XANAX) 0.25 MG tablet, Take 1 tablet by mouth 2 (Two) Times a Day As Needed for Anxiety., Disp: 40 tablet, Rfl: 0  •  Apple Cider Vinegar 300 MG tablet, Take 2 tablets by mouth Daily., Disp: , Rfl:   •  atorvastatin (LIPITOR) 40 MG tablet, Take 1 tablet by mouth Daily., Disp: 90 tablet, Rfl: 1  •  B Complex Vitamins (B COMPLEX 50) tablet, Take 1 tablet by mouth Daily., Disp: , Rfl:   •  buPROPion XL (WELLBUTRIN XL) 300 MG 24 hr tablet, Take 1 tablet by mouth Every Morning., Disp: 90 tablet, Rfl: 0  •  Cholecalciferol (VITAMIN D) 50 MCG (2000 UT) tablet, VITAMIN D 2000 UNIT TABS, Disp: , Rfl:   •  levothyroxine (LEVOXYL) 75 MCG tablet, Take 1 tablet by mouth Daily., Disp: 90 tablet, Rfl: 2  •  methocarbamol (ROBAXIN) 500 MG tablet, 1-2 po qhs prn ms spasm, Disp: 20 tablet, Rfl: 0  •  montelukast (SINGULAIR) 10 MG tablet, Take 1 tablet by mouth Daily., Disp: , Rfl:   •  Multiple Vitamins-Minerals (HAIR SKIN AND NAILS FORMULA PO), Take 2 tablets by mouth Daily., Disp: , Rfl:   •  traZODone (DESYREL) 150 MG tablet, Take 1 tablet by mouth At Night As Needed for Sleep., Disp: 90 tablet, Rfl: 0  •  Triamcinolone Acetonide (NASACORT) 55 MCG/ACT nasal inhaler, 2 sprays into the nostril(s) as directed by provider Daily., Disp: , Rfl:   •  famotidine (PEPCID) 20 MG tablet, Take 1 tablet by mouth 2 (Two) Times a Day., Disp: 30 tablet, Rfl: 0  •  liothyronine (CYTOMEL) 5 MCG tablet, Take 1 tablet by mouth Daily., Disp: 90 tablet, Rfl: 0  •  predniSONE (DELTASONE) 20 MG tablet, Take 3 tablets by mouth Daily., Disp: 15 tablet, Rfl: 0  •  promethazine (PHENERGAN) 12.5 MG tablet, 1-2 po q6hrs prn, Disp: 30 tablet, Rfl: 0    Past Medical History:   Diagnosis Date   • Anxiety    • Degeneration of intervertebral disc of lumbar region 8/1/2017   • Depressive disorder  3/21/2016   • Hypothyroidism        Past Surgical History:   Procedure Laterality Date   • ACHILLES TENDON SURGERY Right    • BREAST BIOPSY      Left Bx = NEG   •  SECTION     • CYST REMOVAL Left     Left Wrist = Benign Cyst   • ESSURE TUBAL LIGATION     • TONSILLECTOMY         Family History   Problem Relation Age of Onset   • Cancer Mother         Esophageal Cancer   • Heart disease Father    • Hypertension Father    • Cancer Father         Breast Cancer       Social History     Socioeconomic History   • Marital status:      Spouse name: Not on file   • Number of children: Not on file   • Years of education: Not on file   • Highest education level: Not on file   Tobacco Use   • Smoking status: Never Smoker   • Smokeless tobacco: Never Used   Substance and Sexual Activity   • Alcohol use: Yes     Comment: 1-2   • Drug use: No   • Sexual activity: Defer       60 y/o C female here w/ new wasp sting on Rt foot    Pt was stung by a wasp on top of her Rt foot Saturday;  foot is swollen.  Patient cannot take benadryl because it hypes her up too much.  Patient is allergic to wasps and states it itches and hurts---tried ice        The following portions of the patient's history were reviewed and updated as appropriate: allergies, current medications, past family history, past medical history, past social history, past surgical history and problem list.    Review of Systems   Constitutional: Positive for activity change. Negative for fever.   Musculoskeletal: Positive for gait problem and myalgias.   Skin: Positive for color change and skin lesions. Negative for rash and bruise.   Psychiatric/Behavioral: Positive for sleep disturbance.       Vitals:    06/15/20 1106   BP: 137/86   Pulse: 61   Resp: 16   Temp: 97.5 °F (36.4 °C)   SpO2: 100%       Objective   Physical Exam   Constitutional: She is oriented to person, place, and time. She appears well-developed and well-nourished. No distress.   HENT:   Head:  Normocephalic and atraumatic.   Musculoskeletal: She exhibits edema and tenderness.     Vascular Status -  Her right foot exhibits normal foot vasculature  and no edema. Her left foot exhibits abnormal foot edema. Her left foot exhibits normal foot vasculature .  Skin Integrity  -  She has right foot onychomycosis.She has left foot onychomycosis and left foot warmth..     Neurological: She is alert and oriented to person, place, and time. No cranial nerve deficit.   Skin: Skin is warm and dry. No rash noted. There is erythema.   Psychiatric: She has a normal mood and affect. Her behavior is normal. Judgment and thought content normal.   Nursing note and vitals reviewed.        Assessment/Plan   Shira was seen today for insect bite.    Diagnoses and all orders for this visit:    Wasp sting, accidental or unintentional, initial encounter    Acquired hypothyroidism  -     T4, Free  -     TSH  -     T3, free; Future  -     T3, free    Other orders  -     promethazine (PHENERGAN) 12.5 MG tablet; 1-2 po q6hrs prn  -     atorvastatin (LIPITOR) 40 MG tablet; Take 1 tablet by mouth Daily.  -     predniSONE (DELTASONE) 20 MG tablet; Take 3 tablets by mouth Daily.  -     famotidine (PEPCID) 20 MG tablet; Take 1 tablet by mouth 2 (Two) Times a Day.    TRIAL of Phenergan and Pepcid and steroid  Rest/ elevate Rt LE

## 2020-06-16 LAB
T3FREE SERPL-MCNC: 2.88 PG/ML (ref 2–4.4)
T4 FREE SERPL-MCNC: 1.3 NG/DL (ref 0.93–1.7)
TSH SERPL DL<=0.05 MIU/L-ACNC: 1.46 UIU/ML (ref 0.27–4.2)

## 2020-06-17 DIAGNOSIS — E03.9 HYPOTHYROIDISM, UNSPECIFIED TYPE: Primary | ICD-10-CM

## 2020-06-17 RX ORDER — LIOTHYRONINE SODIUM 5 UG/1
5 TABLET ORAL DAILY
Qty: 90 TABLET | Refills: 0 | Status: SHIPPED | OUTPATIENT
Start: 2020-06-17

## 2020-07-13 RX ORDER — BUPROPION HYDROCHLORIDE 300 MG/1
TABLET ORAL
Qty: 90 TABLET | Refills: 1 | Status: SHIPPED | OUTPATIENT
Start: 2020-07-13 | End: 2021-04-02 | Stop reason: SDUPTHER

## 2020-07-13 NOTE — TELEPHONE ENCOUNTER
Last visit:  6/15/20  Next visit: none  Last labs: 6/15/20    Rx requested: Buproprion   Pharmacy: [facility]

## 2020-09-03 RX ORDER — TRAZODONE HYDROCHLORIDE 150 MG/1
TABLET ORAL
Qty: 90 TABLET | Refills: 0 | Status: SHIPPED | OUTPATIENT
Start: 2020-09-03 | End: 2021-04-02 | Stop reason: SDUPTHER

## 2020-11-10 DIAGNOSIS — E78.2 MIXED HYPERLIPIDEMIA: Primary | ICD-10-CM

## 2020-11-20 RX ORDER — LEVOTHYROXINE SODIUM 0.07 MG/1
TABLET ORAL
Qty: 90 TABLET | Refills: 3 | OUTPATIENT
Start: 2020-11-20

## 2020-11-20 NOTE — TELEPHONE ENCOUNTER
Last visit:  6/15/20  Next visit:none  Last labs: 6/25/20    Rx requested: Levothyroxine   [facility]

## 2020-12-03 RX ORDER — LEVOTHYROXINE SODIUM 0.07 MG/1
75 TABLET ORAL EVERY 24 HOURS
Qty: 90 TABLET | Refills: 0 | Status: SHIPPED | OUTPATIENT
Start: 2020-12-03 | End: 2021-04-02 | Stop reason: SDUPTHER

## 2021-02-15 RX ORDER — BUPROPION HYDROCHLORIDE 300 MG/1
TABLET ORAL
Qty: 90 TABLET | Refills: 3 | OUTPATIENT
Start: 2021-02-15

## 2021-04-02 NOTE — TELEPHONE ENCOUNTER
Caller: Shira Dumont    Relationship: Self    Best call back number: [phone]    Medication needed:   Requested Prescriptions     Pending Prescriptions Disp Refills   • traZODone (DESYREL) 150 MG tablet 90 tablet 0     Sig: Take 1 tablet by mouth At Night As Needed for Sleep.   • buPROPion XL (WELLBUTRIN XL) 300 MG 24 hr tablet 90 tablet 1     Sig: Take 1 tablet by mouth Every Morning.   • atorvastatin (LIPITOR) 40 MG tablet 90 tablet 1     Sig: Take 1 tablet by mouth Daily.   • levothyroxine (Levoxyl) 75 MCG tablet 90 tablet 0     Sig: Take 1 tablet by mouth Daily.       When do you need the refill by: 04/02/2021    What additional details did the patient provide when requesting the medication: PATIENT IS CURRENTLY OUT OF THE ABOVE MEDICATIONS.     Does the patient have less than a 3 day supply:  [x] Yes  [] No    What [facility] the [facility] [address] - [phone]  - [phone] FX

## 2021-04-05 RX ORDER — LEVOTHYROXINE SODIUM 0.07 MG/1
75 TABLET ORAL EVERY 24 HOURS
Qty: 90 TABLET | Refills: 0 | Status: SHIPPED | OUTPATIENT
Start: 2021-04-05

## 2021-04-05 RX ORDER — BUPROPION HYDROCHLORIDE 300 MG/1
300 TABLET ORAL EVERY MORNING
Qty: 90 TABLET | Refills: 0 | Status: SHIPPED | OUTPATIENT
Start: 2021-04-05

## 2021-04-05 RX ORDER — ATORVASTATIN CALCIUM 40 MG/1
40 TABLET, FILM COATED ORAL EVERY 24 HOURS
Qty: 90 TABLET | Refills: 0 | Status: SHIPPED | OUTPATIENT
Start: 2021-04-05

## 2021-04-05 RX ORDER — TRAZODONE HYDROCHLORIDE 150 MG/1
150 TABLET ORAL NIGHTLY PRN
Qty: 90 TABLET | Refills: 0 | Status: SHIPPED | OUTPATIENT
Start: 2021-04-05

## 2021-04-05 NOTE — TELEPHONE ENCOUNTER
PATIENT CALLING AGAIN STATING SHE NEEDS THESE REFILLS ASAP...    PATIENT CAN BE REACHED AT: [phone]